# Patient Record
Sex: FEMALE | Race: WHITE | NOT HISPANIC OR LATINO | URBAN - METROPOLITAN AREA
[De-identification: names, ages, dates, MRNs, and addresses within clinical notes are randomized per-mention and may not be internally consistent; named-entity substitution may affect disease eponyms.]

---

## 2017-06-29 ENCOUNTER — APPOINTMENT (RX ONLY)
Dept: URBAN - METROPOLITAN AREA CLINIC 13 | Facility: CLINIC | Age: 57
Setting detail: DERMATOLOGY
End: 2017-06-29

## 2017-06-29 VITALS
HEART RATE: 79 BPM | DIASTOLIC BLOOD PRESSURE: 77 MMHG | WEIGHT: 195 LBS | SYSTOLIC BLOOD PRESSURE: 125 MMHG | HEIGHT: 65 IN

## 2017-06-29 DIAGNOSIS — L81.8 OTHER SPECIFIED DISORDERS OF PIGMENTATION: ICD-10-CM

## 2017-06-29 DIAGNOSIS — D18.0 HEMANGIOMA: ICD-10-CM

## 2017-06-29 DIAGNOSIS — L71.0 PERIORAL DERMATITIS: ICD-10-CM

## 2017-06-29 DIAGNOSIS — L82.1 OTHER SEBORRHEIC KERATOSIS: ICD-10-CM

## 2017-06-29 DIAGNOSIS — D22 MELANOCYTIC NEVI: ICD-10-CM

## 2017-06-29 DIAGNOSIS — L85.3 XEROSIS CUTIS: ICD-10-CM

## 2017-06-29 DIAGNOSIS — L57.8 OTHER SKIN CHANGES DUE TO CHRONIC EXPOSURE TO NONIONIZING RADIATION: ICD-10-CM

## 2017-06-29 DIAGNOSIS — L81.4 OTHER MELANIN HYPERPIGMENTATION: ICD-10-CM

## 2017-06-29 PROBLEM — L55.1 SUNBURN OF SECOND DEGREE: Status: ACTIVE | Noted: 2017-06-29

## 2017-06-29 PROBLEM — D22.5 MELANOCYTIC NEVI OF TRUNK: Status: ACTIVE | Noted: 2017-06-29

## 2017-06-29 PROBLEM — L70.0 ACNE VULGARIS: Status: ACTIVE | Noted: 2017-06-29

## 2017-06-29 PROBLEM — D18.01 HEMANGIOMA OF SKIN AND SUBCUTANEOUS TISSUE: Status: ACTIVE | Noted: 2017-06-29

## 2017-06-29 PROCEDURE — ? TREATMENT REGIMEN

## 2017-06-29 PROCEDURE — ? PRESCRIPTION

## 2017-06-29 PROCEDURE — ? OBSERVATION

## 2017-06-29 PROCEDURE — ? COUNSELING

## 2017-06-29 PROCEDURE — ? LIQUID NITROGEN (COSMETIC)

## 2017-06-29 PROCEDURE — 99215 OFFICE O/P EST HI 40 MIN: CPT

## 2017-06-29 RX ORDER — MINOCYCLINE HYDROCHLORIDE 100 MG/1
CAPSULE ORAL BID
Qty: 60 | Refills: 1 | Status: ERX | COMMUNITY
Start: 2017-06-29

## 2017-06-29 RX ADMIN — MINOCYCLINE HYDROCHLORIDE: 100 CAPSULE ORAL at 22:25

## 2017-06-29 ASSESSMENT — LOCATION SIMPLE DESCRIPTION DERM
LOCATION SIMPLE: RIGHT LIP
LOCATION SIMPLE: LEFT UPPER BACK
LOCATION SIMPLE: RIGHT FOREARM
LOCATION SIMPLE: LEFT PRETIBIAL REGION
LOCATION SIMPLE: RIGHT PRETIBIAL REGION
LOCATION SIMPLE: RIGHT UPPER BACK
LOCATION SIMPLE: RIGHT ANKLE

## 2017-06-29 ASSESSMENT — LOCATION ZONE DERM
LOCATION ZONE: LIP
LOCATION ZONE: TRUNK
LOCATION ZONE: ARM
LOCATION ZONE: LEG

## 2017-06-29 ASSESSMENT — LOCATION DETAILED DESCRIPTION DERM
LOCATION DETAILED: RIGHT PROXIMAL DORSAL FOREARM
LOCATION DETAILED: RIGHT ANTERIOR LATERAL MALLEOLUS
LOCATION DETAILED: RIGHT SUPERIOR MEDIAL UPPER BACK
LOCATION DETAILED: RIGHT MEDIAL UPPER BACK
LOCATION DETAILED: LEFT SUPERIOR LATERAL UPPER BACK
LOCATION DETAILED: RIGHT LOWER CUTANEOUS LIP
LOCATION DETAILED: RIGHT DISTAL PRETIBIAL REGION
LOCATION DETAILED: LEFT MEDIAL DISTAL PRETIBIAL REGION

## 2017-06-29 NOTE — PROCEDURE: LIQUID NITROGEN (COSMETIC)
Render Post-Care Instructions In Note?: no
Consent: The patient's consent was obtained including but not limited to risks of crusting, scabbing, blistering, scarring, darker or lighter pigmentary change, recurrence, incomplete removal and infection. The patient understands that the procedure is cosmetic in nature and is not covered by insurance.
Post-Care Instructions: I reviewed with the patient in detail post-care instructions. Patient is to wear sunprotection, and avoid picking at any of the treated lesions. Pt may apply Vaseline to crusted or scabbing areas.
Detail Level: Detailed
Price (Use Numbers Only, No Special Characters Or $): 0

## 2017-06-29 NOTE — PROCEDURE: TREATMENT REGIMEN
Otc Regimen: Use moisturizer 1-2x daily
Samples Given: Two sample tubes of Cerave anti-itch cream
Detail Level: Zone

## 2017-06-29 NOTE — HPI: PIMPLES (ACNE)
How Severe Is Your Acne?: mild
Is This A New Presentation, Or A Follow-Up?: Acne
Additional Comments (Use Complete Sentences): \\n\\nPotentially from CPAP mask.

## 2017-06-29 NOTE — PROCEDURE: OBSERVATION
X Size Of Lesion In Cm (Optional): 0
Detail Level: Generalized
Body Location Override (Optional - Billing Will Still Be Based On Selected Body Map Location If Applicable): forearms
Detail Level: Detailed
Body Location Override (Optional - Billing Will Still Be Based On Selected Body Map Location If Applicable): perioral skin
Body Location Override (Optional - Billing Will Still Be Based On Selected Body Map Location If Applicable): lower legs

## 2025-01-17 ENCOUNTER — APPOINTMENT (OUTPATIENT)
Dept: URBAN - METROPOLITAN AREA CLINIC 292 | Facility: CLINIC | Age: 65
Setting detail: DERMATOLOGY
End: 2025-01-17

## 2025-01-17 DIAGNOSIS — L81.4 OTHER MELANIN HYPERPIGMENTATION: ICD-10-CM | Status: STABLE

## 2025-01-17 DIAGNOSIS — L82.0 INFLAMED SEBORRHEIC KERATOSIS: ICD-10-CM | Status: INADEQUATELY CONTROLLED

## 2025-01-17 DIAGNOSIS — D22 MELANOCYTIC NEVI: ICD-10-CM | Status: STABLE

## 2025-01-17 DIAGNOSIS — D18.0 HEMANGIOMA: ICD-10-CM | Status: STABLE

## 2025-01-17 DIAGNOSIS — Z80.8 FAMILY HISTORY OF MALIGNANT NEOPLASM OF OTHER ORGANS OR SYSTEMS: ICD-10-CM | Status: STABLE

## 2025-01-17 DIAGNOSIS — H01.13 ECZEMATOUS DERMATITIS OF EYELID: ICD-10-CM | Status: INADEQUATELY CONTROLLED

## 2025-01-17 DIAGNOSIS — L82.1 OTHER SEBORRHEIC KERATOSIS: ICD-10-CM | Status: STABLE

## 2025-01-17 PROBLEM — D18.01 HEMANGIOMA OF SKIN AND SUBCUTANEOUS TISSUE: Status: ACTIVE | Noted: 2025-01-17

## 2025-01-17 PROBLEM — D22.5 MELANOCYTIC NEVI OF TRUNK: Status: ACTIVE | Noted: 2025-01-17

## 2025-01-17 PROBLEM — H01.139 ECZEMATOUS DERMATITIS OF UNSPECIFIED EYE, UNSPECIFIED EYELID: Status: ACTIVE | Noted: 2025-01-17

## 2025-01-17 PROBLEM — H01.134 ECZEMATOUS DERMATITIS OF LEFT UPPER EYELID: Status: ACTIVE | Noted: 2025-01-17

## 2025-01-17 PROCEDURE — ? PRESCRIPTION

## 2025-01-17 PROCEDURE — 17110 DESTRUCTION B9 LES UP TO 14: CPT

## 2025-01-17 PROCEDURE — ? FULL BODY SKIN EXAM

## 2025-01-17 PROCEDURE — ? PRESCRIPTION MEDICATION MANAGEMENT

## 2025-01-17 PROCEDURE — 99203 OFFICE O/P NEW LOW 30 MIN: CPT | Mod: 25

## 2025-01-17 PROCEDURE — ? TREATMENT REGIMEN

## 2025-01-17 PROCEDURE — ? COUNSELING

## 2025-01-17 PROCEDURE — ? LIQUID NITROGEN

## 2025-01-17 RX ORDER — HYDROCORTISONE 25 MG/G
OINTMENT TOPICAL
Qty: 28.35 | Refills: 1 | Status: ERX | COMMUNITY
Start: 2025-01-17

## 2025-01-17 RX ADMIN — HYDROCORTISONE: 25 OINTMENT TOPICAL at 00:00

## 2025-01-17 ASSESSMENT — LOCATION DETAILED DESCRIPTION DERM
LOCATION DETAILED: RIGHT DISTAL DORSAL FOREARM
LOCATION DETAILED: LEFT SUPRATARSAL CREASE
LOCATION DETAILED: LEFT PROXIMAL DORSAL FOREARM
LOCATION DETAILED: RIGHT SUPERIOR UPPER BACK
LOCATION DETAILED: RIGHT MEDIAL SUPERIOR CHEST
LOCATION DETAILED: EPIGASTRIC SKIN
LOCATION DETAILED: RIGHT MID-UPPER BACK
LOCATION DETAILED: LEFT SUPERIOR UPPER BACK
LOCATION DETAILED: MIDDLE STERNUM
LOCATION DETAILED: RIGHT CENTRAL EYEBROW
LOCATION DETAILED: INFERIOR THORACIC SPINE
LOCATION DETAILED: UPPER STERNUM
LOCATION DETAILED: PERIUMBILICAL SKIN

## 2025-01-17 ASSESSMENT — LOCATION ZONE DERM
LOCATION ZONE: EYELID
LOCATION ZONE: TRUNK
LOCATION ZONE: ARM
LOCATION ZONE: FACE

## 2025-01-17 ASSESSMENT — LOCATION SIMPLE DESCRIPTION DERM
LOCATION SIMPLE: LEFT FOREARM
LOCATION SIMPLE: CHEST
LOCATION SIMPLE: ABDOMEN
LOCATION SIMPLE: LEFT SUPERIOR EYELID
LOCATION SIMPLE: RIGHT UPPER BACK
LOCATION SIMPLE: RIGHT EYEBROW
LOCATION SIMPLE: LEFT UPPER BACK
LOCATION SIMPLE: UPPER BACK
LOCATION SIMPLE: RIGHT FOREARM

## 2025-01-17 NOTE — PROCEDURE: COUNSELING
Medication refill requested for oxyCODONE-Acetaminophen 5-325 MG Oral Tablet (PERCOCET)   Last Refill/Prescribed: Date 12/27/2023, # of tablets: 90, # of refills approved:0     No protocol for requested medication.    Medication: oxyCODONE-Acetaminophen 5-325 MG Oral Tablet (PERCOCET)   Last office visit date: 12/15/2023    Pharmacy: [id number] [place], [place] - [address] [place] [address] ST AT [facility] [address]    Order pended, routed to clinician for review.   
Detail Level: Generalized
Detail Level: Zone
Detail Level: Detailed

## 2025-04-04 ENCOUNTER — APPOINTMENT (OUTPATIENT)
Dept: URBAN - METROPOLITAN AREA CLINIC 292 | Facility: CLINIC | Age: 65
Setting detail: DERMATOLOGY
End: 2025-04-04

## 2025-04-04 DIAGNOSIS — L71.0 PERIORAL DERMATITIS: ICD-10-CM | Status: INADEQUATELY CONTROLLED

## 2025-04-04 PROCEDURE — ? PRESCRIPTION MEDICATION MANAGEMENT

## 2025-04-04 PROCEDURE — 99214 OFFICE O/P EST MOD 30 MIN: CPT

## 2025-04-04 PROCEDURE — ? PRESCRIPTION

## 2025-04-04 PROCEDURE — ? COUNSELING

## 2025-04-04 RX ORDER — METRONIDAZOLE 7.5 MG/ML
LOTION TOPICAL TWICE DAILY
Qty: 59 | Refills: 2 | Status: ERX | COMMUNITY
Start: 2025-04-04

## 2025-04-04 RX ADMIN — METRONIDAZOLE: 7.5 LOTION TOPICAL at 00:00

## 2025-04-04 ASSESSMENT — LOCATION DETAILED DESCRIPTION DERM
LOCATION DETAILED: RIGHT LATERAL EYEBROW
LOCATION DETAILED: NASAL DORSUM

## 2025-04-04 ASSESSMENT — LOCATION ZONE DERM
LOCATION ZONE: FACE
LOCATION ZONE: NOSE

## 2025-04-04 ASSESSMENT — LOCATION SIMPLE DESCRIPTION DERM
LOCATION SIMPLE: NOSE
LOCATION SIMPLE: RIGHT EYEBROW

## 2025-04-04 ASSESSMENT — INVESTIGATOR STATIC GLOBAL ASSESSMENT
IN YOUR EXPERIENCE, AMONG ALL PATIENTS YOU HAVE SEEN WITH THIS CONDITION, HOW SEVERE IS THIS PATIENT'S CONDITION?: MODERATE

## 2025-04-04 NOTE — PROCEDURE: PRESCRIPTION MEDICATION MANAGEMENT
Discontinue Regimen: Hydrocortisone cream
Render In Strict Bullet Format?: No
Detail Level: Zone
Initiate Treatment: Metronidazole lotion BID

## 2025-04-28 ENCOUNTER — APPOINTMENT (OUTPATIENT)
Dept: URBAN - METROPOLITAN AREA CLINIC 292 | Facility: CLINIC | Age: 65
Setting detail: DERMATOLOGY
End: 2025-04-28

## 2025-04-28 DIAGNOSIS — L71.0 PERIORAL DERMATITIS: ICD-10-CM | Status: INADEQUATELY CONTROLLED

## 2025-04-28 PROCEDURE — 99214 OFFICE O/P EST MOD 30 MIN: CPT

## 2025-04-28 PROCEDURE — ? PRESCRIPTION

## 2025-04-28 PROCEDURE — ? TREATMENT REGIMEN

## 2025-04-28 PROCEDURE — ? COUNSELING

## 2025-04-28 RX ORDER — METRONIDAZOLE 7.5 MG/ML
LOTION TOPICAL TWICE DAILY
Qty: 59 | Refills: 2 | Status: ERX

## 2025-04-28 RX ORDER — DOXYCYCLINE 40 MG/1
1 TABLET CAPSULE ORAL TWICE A DAY
Qty: 60 | Refills: 0 | Status: ERX | COMMUNITY
Start: 2025-04-28

## 2025-04-28 RX ADMIN — DOXYCYCLINE 1 TABLET: 40 CAPSULE ORAL at 00:00

## 2025-04-28 ASSESSMENT — LOCATION DETAILED DESCRIPTION DERM
LOCATION DETAILED: RIGHT LATERAL EYEBROW
LOCATION DETAILED: NASAL DORSUM

## 2025-04-28 ASSESSMENT — LOCATION ZONE DERM
LOCATION ZONE: FACE
LOCATION ZONE: NOSE

## 2025-04-28 ASSESSMENT — INVESTIGATOR STATIC GLOBAL ASSESSMENT: IN YOUR EXPERIENCE, AMONG ALL PATIENTS YOU HAVE SEEN WITH THIS CONDITION, HOW SEVERE IS THIS PATIENT'S CONDITION?: MILD

## 2025-04-28 ASSESSMENT — LOCATION SIMPLE DESCRIPTION DERM
LOCATION SIMPLE: RIGHT EYEBROW
LOCATION SIMPLE: NOSE

## 2025-04-28 NOTE — PROCEDURE: TREATMENT REGIMEN
Discontinue Regimen: Hydrocortisone cream.
Modify Regimen: adding in oral abx, pt still having flair ups 
Continue Regimen: metronidazole 0.75 % lotion: Apply pea-sized amount topically to affected areas once daily at night.
Initiate Treatment: Oracea 40 mg capsule,immediate - delay release: Take 1 tablet orally twice a day for 30 days.
Detail Level: Zone